# Patient Record
Sex: MALE | Race: WHITE | NOT HISPANIC OR LATINO | URBAN - METROPOLITAN AREA
[De-identification: names, ages, dates, MRNs, and addresses within clinical notes are randomized per-mention and may not be internally consistent; named-entity substitution may affect disease eponyms.]

---

## 2023-05-31 ENCOUNTER — APPOINTMENT (OUTPATIENT)
Dept: URBAN - METROPOLITAN AREA SURGERY 17 | Age: 78
Setting detail: DERMATOLOGY
End: 2023-06-01

## 2023-05-31 VITALS
RESPIRATION RATE: 14 BRPM | WEIGHT: 230 LBS | SYSTOLIC BLOOD PRESSURE: 128 MMHG | TEMPERATURE: 98.1 F | DIASTOLIC BLOOD PRESSURE: 70 MMHG | HEIGHT: 76 IN | HEART RATE: 76 BPM

## 2023-05-31 PROBLEM — C44.319 BASAL CELL CARCINOMA OF SKIN OF OTHER PARTS OF FACE: Status: ACTIVE | Noted: 2023-05-31

## 2023-05-31 PROBLEM — C44.219 BASAL CELL CARCINOMA OF SKIN OF LEFT EAR AND EXTERNAL AURICULAR CANAL: Status: ACTIVE | Noted: 2023-05-31

## 2023-05-31 PROCEDURE — OTHER MOHS SURGERY: OTHER

## 2023-05-31 PROCEDURE — 17311 MOHS 1 STAGE H/N/HF/G: CPT

## 2023-05-31 PROCEDURE — 13132 CMPLX RPR F/C/C/M/N/AX/G/H/F: CPT

## 2023-05-31 PROCEDURE — 17311 MOHS 1 STAGE H/N/HF/G: CPT | Mod: 76

## 2023-05-31 PROCEDURE — 13152 CMPLX RPR E/N/E/L 2.6-7.5 CM: CPT

## 2023-05-31 PROCEDURE — OTHER MIPS QUALITY: OTHER

## 2023-05-31 PROCEDURE — OTHER CONSULTATION FOR MOHS SURGERY: OTHER

## 2023-05-31 PROCEDURE — 17312 MOHS ADDL STAGE: CPT

## 2023-05-31 NOTE — PROCEDURE: MOHS SURGERY

## 2023-05-31 NOTE — PROCEDURE: CONSULTATION FOR MOHS SURGERY
Detail Level: Detailed
Body Location Override (Optional - Billing Will Still Be Based On Selected Body Map Location If Applicable): Left forehead
Size Of Lesion: 0.5
X Size Of Lesion In Cm (Optional): 0.4
Name Of The Referring Provider For Procedure: Dr. Woodruff
Incorporate Mauc In Note: Yes

## 2023-05-31 NOTE — PROCEDURE: MOHS SURGERY
What Type Of Note Output Would You Prefer (Optional)?: Standard Output Hpi Title: Evaluation of Skin Lesions How Severe Are Your Spot(S)?: moderate Have Your Spot(S) Been Treated In The Past?: has not been treated Consent (Ear)/Introductory Paragraph: The rationale for Mohs was explained to the patient and consent was obtained. The risks, benefits and alternatives to therapy were discussed in detail. Specifically, the risks of ear deformity, infection, scarring, bleeding, prolonged wound healing, incomplete removal, allergy to anesthesia, nerve injury and recurrence were addressed. Prior to the procedure, the treatment site was clearly identified and confirmed by the patient. All components of Universal Protocol/PAUSE Rule completed.

## 2023-05-31 NOTE — PROCEDURE: MOHS SURGERY

## 2023-05-31 NOTE — PROCEDURE: MOHS SURGERY

## 2023-05-31 NOTE — PROCEDURE: MOHS SURGERY
Patient's mother called stating he is want to see if he can have his achilles tendon looked at as it has been bothering him.    Please advise  Thank you   Advancement Flap (Double) Text: A bilateral advancement flap was designed. Local anesthesia was obtained. The defect edges were debeveled with a #15 scalpel blade. The flaps were incised to the underlying adipose tissue. The flaps were then undermined and elevated. The tissue surrounding the operative site was undermined extensively with blunt scissor dissection. Hemostasis was obtained using electrocoagulation. The flaps were then advanced into the primary defect. Raised tissue cones were removed as necessary by standard technique. The superficial fascia and subcutaneous layers were closed with buried vertical mattress sutures. The epidermis was then approximated with nylon sutures. Careful attention was given to eversion and even approximation of the wound edges. A pressure dressing was applied.

## 2024-02-14 NOTE — PROCEDURE: MOHS SURGERY
Medicated with Demerol 12,3 mg for shivers at 0856   Diet, KAREN (09-29-23 @ 14:12)   Surgeon Performing Repair (Optional): NICOLE Pickard PA-C

## 2024-04-24 NOTE — PROCEDURE: MOHS SURGERY
GENERAL ANESTHESIA/IV SEDATION/SPINAL POST SURGERY INSTRUCTIONS    1. Rest at home (not necessarily in bed) for 24 hours following anesthesia. You may feel sleepy for the next 24 hours. Do not drive an automobile, operate heavy machinery, or make important decisions.    2. Your diet should start with clear liquids increasing to a light diet on the day of surgery. You may then resume your normal diet. Do not drink alcoholic beverages for 24 hours. If nausea occurs, limit diet to clear liquids (soda, tea, broth, Jell-O). If nausea continues for more than 12 hours, call your doctor.    3. The doctor prescribed [ norco ] for pain. Take as directed. If nothing was prescribed, you may take a non-prescription non- aspirin containing pain medicine such as Tylenol, Advil, etc. If pain is not relieved, call your doctor.    Norco is a narcotic and may make you sleepy. Do not drive, drink alcohol, or do anything that requires your full attention while taking it. Take Norco with food to avoid an upset stomach. Do not take Tylenol and Norco at the same time. Norco may constipate you, please take Colace/an over the counter stool softener to prevent constipation.     4. Call your doctor if there is excessive:   A. Bleeding or drainage   B. Fever-10 1°F or higher   C. Swelling or redness    5. We strongly recommend a responsible adult to be with you for 24 hours following surgery. This recommendation is for your protection and safety.    6. Avoid smoking for 24 hours following general anesthesia.    7. Drink plenty of fluids. If you are unable to urinate by [ 2:30 pm ] or a feeling of pressure occurs, call your surgeon and or go to the nearest emergency center.    8. If you any questions please call your Surgeon. If you cannot reach your doctor, call [facility] Emergency Department at [phone].    9. Other instructions:    INSTRUCCIONES POSTOPERATORIAS BAJO ANESTESIA GENERAL/SEDACIÓN  IV/INTRADURAL     1. Descanse en casa (no necesariamente en la cama) genesis 24 horas después de la anestesia. Es posible que sienta somnolencia genesis las próximas 24 horas. No conduzca un automóvil, no opere maquinaria pesada, ni tome decisiones importantes.     2. Randall dieta deberá empezar con líquidos berkley aumentando a colten dieta ligera el día de la cirugía. Entonces, podrá reanudar randall dieta normal. No tome bebidas alcohólicas genesis 24 horas. Si tiene náuseas, limite la dieta a líquidos berkley (soda, té, caldo, gelatina). Si las náuseas continúan genesis más de 12 horas, llame a randall médico.     3. El médico le recetó [ norco ] para el dolor. Siga las indicaciones. Si no se le recetó nada, puede juliann medicina para el dolor sin receta médica que no contenga aspirina, alia Tylenol, Advil, etc. Si el dolor no se caitie, llame a randall médico.     [place] es un narcótico y puede causarle sueño. No conduzca, eric alcohol ni misti nada que requiera toda randall atención mientras lo virgil. [place] Norco con comida para evitar malestar estomacal. No tome Tylenol y Norco al mismo tiempo. [place] puede causarle estreñimiento, por favor tome Colace/un ablandador de heces de venta jc para prevenir el estreñimiento.    4. Llame a randall médico si tiene exceso de:   A. Sangrado o drenaje   B. Fiebre-101°F o más nik   C. Hinchazón o enrojecimiento     5. Le recomendamos considerablemente que un adulto responsable esté con usted genesis 24 horas después de la cirugía. Esta recomendación es para randall protección y seguridad.     6. Evite fumar genesis 24 horas después de la anestesia general.     7. [place] muchos líquidos, si no puede orinar para el [ 2:30 pm ] o si tiene colten sensación de presión, llame a randall cirujano o vaya al centro de emergencias más cercano.         8. Si tiene alguna pregunta, llame a randall cirujano. Si no puede localizar a randall médico, llame al departamento de emergencias del [facility] en el  [phone].      9. Otras instrucciones:          Want to recognize a nurse?  The CHANDU Award® is a way to recognize nurses who provided an outstanding level of care to you during your visit.   Submit a nomination using any method below.     [email or web address]   Complex Requirements: Involvement Of Free Margin?: Helical Rim